# Patient Record
Sex: MALE | Race: WHITE | NOT HISPANIC OR LATINO | Employment: FULL TIME | URBAN - METROPOLITAN AREA
[De-identification: names, ages, dates, MRNs, and addresses within clinical notes are randomized per-mention and may not be internally consistent; named-entity substitution may affect disease eponyms.]

---

## 2021-11-01 ASSESSMENT — ENCOUNTER SYMPTOMS
POSTURAL DYSPNEA: 0
BACK PAIN: 1
ARTHRALGIAS: 1
SPUTUM PRODUCTION: 0
INSOMNIA: 0
SHORTNESS OF BREATH: 0
COUGH: 1
SNORES LOUDLY: 1
NERVOUS/ANXIOUS: 0
HEMOPTYSIS: 0
DYSPNEA ON EXERTION: 0
MYALGIAS: 1
DECREASED CONCENTRATION: 0
COUGH DISTURBING SLEEP: 0
MUSCLE WEAKNESS: 0
WHEEZING: 0
DEPRESSION: 0
STIFFNESS: 0
MUSCLE CRAMPS: 1
PANIC: 0
NECK PAIN: 1
JOINT SWELLING: 1

## 2021-11-02 NOTE — PROGRESS NOTES
CC: Establish care      HPI: a 46-year-old man without significant PMH presents today to establish care. He denies any symptoms at all. He works as a  at the [employer]. He smokes a pack of cigarettes per day for 26 years and had used Chantix in the past, which, per patient, it helped with smoking cessation last time. He also mentioned that, recently, his friend passed away because of prostate cancer and he would like to get prostate cancer screening today. Denies family history of prostate cancer or any urinary symptoms.       ROS: Review Of Systems  Skin: negative  Eyes: negative  Ears/Nose/Throat: negative  Respiratory: No shortness of breath, dyspnea on exertion, cough, or hemoptysis  Cardiovascular: negative  Gastrointestinal: negative  Genitourinary: negative  Musculoskeletal: negative and positive for negative  Neurologic: negative  Psychiatric: negative  Hematologic/Lymphatic/Immunologic: negative  Endocrine: negative      RHM:    Vaccinations:   COVID-19: completed on 4/16/2021 and 5/19/2021   Influenza: due today   Tetanus (1 Tdap then td/tdap q10y): 11/16/2009  Screening:   PSA: interested  Sexually active yes, STI screen not interested   Diet: regular diet  Exercise: none  Lifestyle: tobacco use: 1 pack/day for 26 years. Alcohol use: 3-4 beers/day everyday. Recreational drug use: none.  Sleep: 7-8 hrs/night    No past medical history on file.  No past surgical history on file.  No family history on file.  Social History     Tobacco Use     Smoking status: Not on file   Substance Use Topics     Alcohol use: Not on file     Drug use: Not on file     No current outpatient medications on file.      Not on File      /87 (BP Location: Right arm, Patient Position: Sitting, Cuff Size: Adult Regular)   Pulse 103   Temp 98.2  F (36.8  C) (Oral)   Resp 16   Ht 1.829 m (6')   Wt 73 kg (161 lb)   SpO2 97%   BMI 21.84 kg/m      Physical Examination:    General:  Conversant, generally healthy  appearing, no acute distress  Head: atraumatic  Eyes:  Pupils 2-3 mm, sclera white, EOM's full, conjunctiva moist, no periorbital swelling    Ears:  TM's normal, EAC's patent, clear of cerumen  Nose:  Nasal passages clear, turbinates not swollen  Throat/Mouth:  No pharyngeal erythema, exudate, ulcers, oral mucosa and tongue moist, normal hard and soft palate  Neck:  Trachea midline, Full AROM, supple, thyroid smooth, symmetric, not enlarged, no nodules, no neck lymphadenopathy  Lungs:  Clear to auscultation throughout, no wheezes, rhonchi or rales. Normal respiratory effort and no intercostal retractions.  C/V:  Regular rate and rhythm, no murmurs, rubs or gallops.  No JVD, no carotid bruits. Radial and DP pulses 2+, equal and regular.  Abdomen:  Not distended.  Bowel sounds active.  No tenderness, no hepatosplenomegaly or masses.  No CVA tenderness or masses.  Lymph:  No cervical lymph nodes.  Neuro: Alert and oriented, face symmetric. Able to get on/off exam table without assistance.  Strength grossly intact. No tremor.  Gait steady.   M/S:   No joint deformities noted.  No joint swelling.  Skin:   Normal temperature., turgor and texture. No rashes, suspicious lesions, or jaundice on exposed skin surfaces.   Extremities:  No peripheral edema, no digital cyanosis  Psych:  Alert and oriented. Appropriate affect.  Not psychomotor slowed.  No signs of anxiety or agitation.      Assessment and Plan:  46-year-old man without significant PMH presents today to establish care.    #Health Maintenance  He is due for cholesterol screening, a flu shot and tetanus booster. He also mentioned that, recently, his friend passed away because of prostate cancer and he would like to get prostate cancer screening today. Denies family history of prostate cancer or any urinary symptoms.  Plan:  - Flu shot ad Tdap booster today  - PSA and non-fasting lipid profile today  - Follow up with me in 1 year    #Smoking Cessation   He smokes a  pack of cigarettes per day for 26 years and had used Chantix in the past, which, per patient, it helped with smoking cessation last time.  Plan:  - Chantix Kanu today    Apolonia Avila MD  Internal Medicine Resident (PGY1)  [phone]      This patient was discussed and seen with CURT Castro  Answers for HPI/ROS submitted by the patient on 11/1/2021  General Symptoms: No  Skin Symptoms: No  HENT Symptoms: No  EYE SYMPTOMS: No  HEART SYMPTOMS: No  LUNG SYMPTOMS: Yes  INTESTINAL SYMPTOMS: No  URINARY SYMPTOMS: No  REPRODUCTIVE SYMPTOMS: No  SKELETAL SYMPTOMS: Yes  BLOOD SYMPTOMS: No  NERVOUS SYSTEM SYMPTOMS: No  MENTAL HEALTH SYMPTOMS: Yes  Cough: Yes  Sputum or phlegm: No  Coughing up blood: No  Difficulty breating or shortness of breath: No  Snoring: Yes  Wheezing: No  Difficulty breathing on exertion: No  Nighttime Cough: No  Difficulty breathing when lying flat: No  Back pain: Yes  Muscle aches: Yes  Neck pain: Yes  Swollen joints: Yes  Joint pain: Yes  Bone pain: No  Muscle cramps: Yes  Muscle weakness: No  Joint stiffness: No  Bone fracture: No  Nervous or Anxious: No  Depression: No  Trouble sleeping: No  Trouble thinking or concentrating: No  Mood changes: No  Panic attacks: No

## 2021-11-04 ENCOUNTER — OFFICE VISIT (OUTPATIENT)
Dept: INTERNAL MEDICINE | Facility: CLINIC | Age: 46
End: 2021-11-04
Payer: COMMERCIAL

## 2021-11-04 ENCOUNTER — LAB (OUTPATIENT)
Dept: LAB | Facility: CLINIC | Age: 46
End: 2021-11-04
Payer: COMMERCIAL

## 2021-11-04 VITALS
WEIGHT: 161 LBS | RESPIRATION RATE: 16 BRPM | HEART RATE: 103 BPM | DIASTOLIC BLOOD PRESSURE: 87 MMHG | HEIGHT: 72 IN | SYSTOLIC BLOOD PRESSURE: 126 MMHG | OXYGEN SATURATION: 97 % | BODY MASS INDEX: 21.81 KG/M2 | TEMPERATURE: 98.2 F

## 2021-11-04 DIAGNOSIS — Z71.6 ENCOUNTER FOR SMOKING CESSATION COUNSELING: ICD-10-CM

## 2021-11-04 DIAGNOSIS — Z23 NEED FOR TDAP VACCINATION: ICD-10-CM

## 2021-11-04 DIAGNOSIS — Z00.00 ROUTINE HISTORY AND PHYSICAL EXAMINATION OF ADULT: Primary | ICD-10-CM

## 2021-11-04 DIAGNOSIS — Z00.00 ROUTINE HISTORY AND PHYSICAL EXAMINATION OF ADULT: ICD-10-CM

## 2021-11-04 LAB
CHOLEST SERPL-MCNC: 291 MG/DL
FASTING STATUS PATIENT QL REPORTED: NO
HDLC SERPL-MCNC: 150 MG/DL
LDLC SERPL CALC-MCNC: 128 MG/DL
NONHDLC SERPL-MCNC: 141 MG/DL
PSA SERPL-MCNC: 0.84 UG/L (ref 0–4)
TRIGL SERPL-MCNC: 64 MG/DL

## 2021-11-04 PROCEDURE — 90686 IIV4 VACC NO PRSV 0.5 ML IM: CPT

## 2021-11-04 PROCEDURE — G0103 PSA SCREENING: HCPCS | Performed by: PATHOLOGY

## 2021-11-04 PROCEDURE — 36415 COLL VENOUS BLD VENIPUNCTURE: CPT | Performed by: PATHOLOGY

## 2021-11-04 PROCEDURE — 99204 OFFICE O/P NEW MOD 45 MIN: CPT | Mod: 25

## 2021-11-04 PROCEDURE — 90715 TDAP VACCINE 7 YRS/> IM: CPT

## 2021-11-04 PROCEDURE — 90472 IMMUNIZATION ADMIN EACH ADD: CPT

## 2021-11-04 PROCEDURE — 80061 LIPID PANEL: CPT | Performed by: PATHOLOGY

## 2021-11-04 PROCEDURE — 90471 IMMUNIZATION ADMIN: CPT

## 2021-11-04 ASSESSMENT — MIFFLIN-ST. JEOR: SCORE: 1648.29

## 2021-11-04 ASSESSMENT — PAIN SCALES - GENERAL: PAINLEVEL: NO PAIN (0)

## 2021-11-04 NOTE — NURSING NOTE
Florian Rodriguez is a 46 year old male patient that presents today in clinic for the following:    Chief Complaint   Patient presents with     Physical     Prostate check, would like prescription for chantax     Establish Care     The patient's allergies and medications were reviewed as noted. A set of vitals were recorded as noted without incident. The patient does not have any other questions for the provider.    Franci Hercules, EMT at 1:55 PM on 11/4/2021

## 2021-11-04 NOTE — PATIENT INSTRUCTIONS
To schedule your 1 year follow up with Dr. Avila, please call [phone] (4th Floor [facility] [place] [facility])

## 2021-12-12 ENCOUNTER — HEALTH MAINTENANCE LETTER (OUTPATIENT)
Age: 46
End: 2021-12-12

## 2022-01-09 ENCOUNTER — NURSE TRIAGE (OUTPATIENT)
Dept: NURSING | Facility: CLINIC | Age: 47
End: 2022-01-09
Payer: COMMERCIAL

## 2022-01-09 NOTE — TELEPHONE ENCOUNTER
"Triage Call:     Pt calling to report the following:     Got sick at work on Friday and exhausted Saturday and called off today  He felt like his head was on fire, but no fever  He went to get a COVID-19 test today    Head Lump:   Pt has a \"lump on the back of his head\" that he found yesterday  No blood   \"Sore spot\"  Smaller than 2 inches  He doesn't know how it got there    Memory issues:  Patient doesn't remember driving home from work the other day  His girlfriend stated to patient that he has \"been repeating questions over the last 3-5 days\"    Headache:   Headache for the last couple of days  1/10 headache pain    Social Hx:   No drug use or abuse  Drinks; but does not abuse alcohol  Pt is a     Sleep:   Not sleeping well at night, but has no issues with sleeping during the day    Disposition: Go to ED now. Pt is declining this recommendation and stated that he will follow up with his PCP instead. He was able to get an appointment for tomorrow, 1/10/2022 in the AM.     Nancie Horton RN  [facility] Nurse Advisor 4:21 PM 1/9/2022      Reason for Disposition    [1] New onset confusion AND [2] no prior diagnosis of dementia    [1] Worsening confusion AND [2] new onset (hours to 3 days)    Patient is confused or is an unreliable provider of information (e.g., dementia, severe intellectual disability, alcohol intoxication)    Additional Information    Negative: [1] Difficult to awaken or acting confused (e.g., disoriented, slurred speech) AND [2] present now AND [3] new onset AND [4] has diabetes (diabetes mellitus)    Negative: [1] Difficult to awaken or acting confused (e.g., disoriented, slurred speech) AND [2] present now AND [3] new onset    Negative: [1] Weakness of the face, arm, or leg on one side of the body AND [2] new onset    Negative: [1] Numbness of the face, arm, or leg on one side of the body AND [2] new onset    Negative: [1] Loss of speech or garbled speech AND [2] new onset    " Negative: Shock suspected (e.g., cold/pale/clammy skin, too weak to stand, low BP, rapid pulse)    Negative: Sounds like a life-threatening emergency to the triager    Negative: [1] Difficult to awaken or acting confused (e.g., disoriented, slurred speech) AND [2] present now AND [3] has diabetes (diabetes mellitus)    Negative: [1] Difficult to awaken or acting confused (e.g., disoriented, slurred speech) AND [2] present now AND [3] new onset    Negative: [1] Weakness of the face, arm, or leg on one side of the body AND [2] new onset    Negative: [1] Numbness of the face, arm, or leg on one side of the body AND [2] new onset    Negative: [1] Loss of speech or garbled speech AND [2] new onset    Negative: Difficulty breathing or bluish lips    Negative: Shock suspected (e.g., cold/pale/clammy skin, too weak to stand, low BP, rapid pulse)    Negative: Seeing, hearing, or feeling things that are not there (i.e., visual, auditory, or tactile hallucinations)    Negative: Followed a head injury    Negative: Drug overdose suspected    Negative: Sounds like a life-threatening emergency to the triager    Negative: Alcohol use, abuse or dependence: question or problem related to    Negative: Drug abuse or dependence: question or problem related to    Negative: Headache or vomiting    Negative: Stiff neck (can't touch chin to chest)    Negative: Very strange or paranoid behavior    Negative: Fever > 100.4 F (38.0 C)    Negative: Patient sounds very sick or weak to the triager    Negative: [1] ACUTE NEURO SYMPTOM AND [2] present now  (DEFINITION: difficult to awaken OR confused thinking and talking OR slurred speech OR weakness of arms OR unsteady walking)    Negative: Knocked out (unconscious) > 1 minute    Negative: Seizure (convulsion) occurred  (Exception: prior history of seizures and now alert and without Acute Neuro Symptoms)    Negative: Penetrating head injury (e.g., knife, gun shot wound, metal object)    Negative:  "[1] Major bleeding (e.g., actively dripping or spurting) AND [2] can't be stopped    Negative: [1] Dangerous mechanism of injury (e.g., MVA, diving, trampoline, contact sports, fall > 10 feet or 3 meters) AND [2] NECK pain AND [3] began < 1 hour after injury    Negative: Sounds like a life-threatening emergency to the triager    Negative: [1] Diagnosed with concussion AND [2] within last 14 days    Negative: [1] Traumatic brain injury (mTBI; concussion) AND [2] more than 14 days since head injury    Negative: [1] Loss of vision or double vision AND [2] present now    Negative: Watery or blood-tinged fluid dripping from the NOSE or EARS now  (Exception: tears from crying or nosebleed from nasal trauma)    Negative: [1] One or two \"black eyes\" (bruising, purple color of eyelids) AND [2] onset within 24 hours of head injury    Negative: Large swelling or bruise > 2 inches (5 cm)    Negative: Skin is split open or gaping  (or length > 1/2 inch or 12 mm)    Negative: [1] Bleeding AND [2] won't stop after 10 minutes of direct pressure (using correct technique)    Negative: Sounds like a serious injury to the triager    Negative: Can't remember what happened (amnesia)    Negative: Vomiting once or more    Negative: [1] ACUTE NEURO SYMPTOM AND [2] now fine  (DEFINITION: difficult to awaken OR confused thinking and talking OR slurred speech OR weakness of arms OR unsteady walking)    Negative: [1] Knocked out (unconscious) < 1 minute AND [2] now fine    Negative: [1] SEVERE headache AND [2] not improved 2 hours after pain medicine/ice packs    Negative: Dangerous injury (e.g., MVA, diving, trampoline, contact sports, fall > 10 feet or 3 meters) or severe blow from hard object (e.g., golf club or baseball bat)    Negative: Taking Coumadin (warfarin) or other strong blood thinner, or known bleeding disorder (e.g., thrombocytopenia)    Negative: Severe agitation or behavior problem (e.g., patient endangering self or others)    " Negative: Swallowing problems    Negative: Weakness or fatigue is main concern    Negative: Anxiety or panic attack is the main concern    Negative: Depression is the main concern    Protocols used: DEMENTIA SYMPTOMS AND [id number]-MC, HEAD INJURY-A-, CONFUSION - DELIRIUM-A-AH

## 2022-01-10 ENCOUNTER — HOSPITAL ENCOUNTER (EMERGENCY)
Facility: CLINIC | Age: 47
End: 2022-01-10
Payer: COMMERCIAL

## 2022-01-10 ENCOUNTER — APPOINTMENT (OUTPATIENT)
Dept: CT IMAGING | Facility: CLINIC | Age: 47
End: 2022-01-10
Attending: EMERGENCY MEDICINE
Payer: COMMERCIAL

## 2022-01-10 ENCOUNTER — OFFICE VISIT (OUTPATIENT)
Dept: FAMILY MEDICINE | Facility: CLINIC | Age: 47
End: 2022-01-10
Payer: COMMERCIAL

## 2022-01-10 ENCOUNTER — HOSPITAL ENCOUNTER (EMERGENCY)
Facility: CLINIC | Age: 47
Discharge: HOME | End: 2022-01-10
Attending: EMERGENCY MEDICINE | Admitting: EMERGENCY MEDICINE
Payer: COMMERCIAL

## 2022-01-10 VITALS
WEIGHT: 161 LBS | HEART RATE: 102 BPM | BODY MASS INDEX: 21.84 KG/M2 | TEMPERATURE: 98.1 F | SYSTOLIC BLOOD PRESSURE: 118 MMHG | DIASTOLIC BLOOD PRESSURE: 89 MMHG | OXYGEN SATURATION: 98 %

## 2022-01-10 VITALS
HEIGHT: 72 IN | HEART RATE: 104 BPM | TEMPERATURE: 98 F | DIASTOLIC BLOOD PRESSURE: 96 MMHG | RESPIRATION RATE: 16 BRPM | SYSTOLIC BLOOD PRESSURE: 125 MMHG | BODY MASS INDEX: 22.35 KG/M2 | OXYGEN SATURATION: 96 % | WEIGHT: 165 LBS

## 2022-01-10 DIAGNOSIS — S09.90XA HEAD INJURIES, INITIAL ENCOUNTER: ICD-10-CM

## 2022-01-10 DIAGNOSIS — F10.920 ALCOHOLIC INTOXICATION WITHOUT COMPLICATION (H): ICD-10-CM

## 2022-01-10 DIAGNOSIS — R41.3 MEMORY LOSS: Primary | ICD-10-CM

## 2022-01-10 DIAGNOSIS — F10.20 ALCOHOLISM (H): ICD-10-CM

## 2022-01-10 LAB
ALBUMIN SERPL-MCNC: 4.2 G/DL (ref 3.4–5)
ALP SERPL-CCNC: 88 U/L (ref 40–150)
ALT SERPL W P-5'-P-CCNC: 112 U/L (ref 0–70)
ANION GAP SERPL CALCULATED.3IONS-SCNC: 10 MMOL/L (ref 3–14)
AST SERPL W P-5'-P-CCNC: 165 U/L (ref 0–45)
BASOPHILS # BLD AUTO: 0.1 10E3/UL (ref 0–0.2)
BASOPHILS NFR BLD AUTO: 2 %
BILIRUB SERPL-MCNC: 0.5 MG/DL (ref 0.2–1.3)
BUN SERPL-MCNC: 6 MG/DL (ref 7–30)
CALCIUM SERPL-MCNC: 8.8 MG/DL (ref 8.5–10.1)
CHLORIDE BLD-SCNC: 102 MMOL/L (ref 94–109)
CO2 SERPL-SCNC: 28 MMOL/L (ref 20–32)
CREAT SERPL-MCNC: 0.64 MG/DL (ref 0.66–1.25)
EOSINOPHIL # BLD AUTO: 0.2 10E3/UL (ref 0–0.7)
EOSINOPHIL NFR BLD AUTO: 4 %
ERYTHROCYTE [DISTWIDTH] IN BLOOD BY AUTOMATED COUNT: 14.4 % (ref 10–15)
ETHANOL SERPL-MCNC: 0.3 G/DL
GFR SERPL CREATININE-BSD FRML MDRD: >90 ML/MIN/1.73M2
GLUCOSE BLD-MCNC: 84 MG/DL (ref 70–99)
HCT VFR BLD AUTO: 42.9 % (ref 40–53)
HGB BLD-MCNC: 14.5 G/DL (ref 13.3–17.7)
HOLD SPECIMEN: NORMAL
HOLD SPECIMEN: NORMAL
IMM GRANULOCYTES # BLD: 0 10E3/UL
IMM GRANULOCYTES NFR BLD: 1 %
LYMPHOCYTES # BLD AUTO: 1.7 10E3/UL (ref 0.8–5.3)
LYMPHOCYTES NFR BLD AUTO: 30 %
MCH RBC QN AUTO: 35 PG (ref 26.5–33)
MCHC RBC AUTO-ENTMCNC: 33.8 G/DL (ref 31.5–36.5)
MCV RBC AUTO: 104 FL (ref 78–100)
MONOCYTES # BLD AUTO: 0.5 10E3/UL (ref 0–1.3)
MONOCYTES NFR BLD AUTO: 9 %
NEUTROPHILS # BLD AUTO: 3.1 10E3/UL (ref 1.6–8.3)
NEUTROPHILS NFR BLD AUTO: 54 %
NRBC # BLD AUTO: 0 10E3/UL
NRBC BLD AUTO-RTO: 0 /100
PLATELET # BLD AUTO: 165 10E3/UL (ref 150–450)
POTASSIUM BLD-SCNC: 3.9 MMOL/L (ref 3.4–5.3)
PROT SERPL-MCNC: 7.9 G/DL (ref 6.8–8.8)
RBC # BLD AUTO: 4.14 10E6/UL (ref 4.4–5.9)
SODIUM SERPL-SCNC: 140 MMOL/L (ref 133–144)
WBC # BLD AUTO: 5.6 10E3/UL (ref 4–11)

## 2022-01-10 PROCEDURE — 36415 COLL VENOUS BLD VENIPUNCTURE: CPT | Performed by: EMERGENCY MEDICINE

## 2022-01-10 PROCEDURE — 80053 COMPREHEN METABOLIC PANEL: CPT | Performed by: EMERGENCY MEDICINE

## 2022-01-10 PROCEDURE — 99284 EMERGENCY DEPT VISIT MOD MDM: CPT | Mod: 25

## 2022-01-10 PROCEDURE — 82077 ASSAY SPEC XCP UR&BREATH IA: CPT | Performed by: EMERGENCY MEDICINE

## 2022-01-10 PROCEDURE — 70450 CT HEAD/BRAIN W/O DYE: CPT

## 2022-01-10 PROCEDURE — 84425 ASSAY OF VITAMIN B-1: CPT | Performed by: EMERGENCY MEDICINE

## 2022-01-10 PROCEDURE — 99215 OFFICE O/P EST HI 40 MIN: CPT | Performed by: NURSE PRACTITIONER

## 2022-01-10 PROCEDURE — 85025 COMPLETE CBC W/AUTO DIFF WBC: CPT | Performed by: EMERGENCY MEDICINE

## 2022-01-10 RX ORDER — CHLORDIAZEPOXIDE HYDROCHLORIDE 5 MG/1
10 CAPSULE, GELATIN COATED ORAL 3 TIMES DAILY PRN
Qty: 12 CAPSULE | Refills: 0 | Status: SHIPPED | OUTPATIENT
Start: 2022-01-10 | End: 2022-01-13

## 2022-01-10 ASSESSMENT — ENCOUNTER SYMPTOMS
FEVER: 0
DIARRHEA: 0
SHORTNESS OF BREATH: 0
COUGH: 0
ABDOMINAL PAIN: 0
CHILLS: 0
VOMITING: 1
SORE THROAT: 0
RHINORRHEA: 0

## 2022-01-10 ASSESSMENT — MIFFLIN-ST. JEOR: SCORE: 1666.44

## 2022-01-10 NOTE — PROGRESS NOTES
"  Assessment & Plan     Memory loss/Head injuries, initial encounter  Patient w/recent head injury of unknown etiology; unknown LOC; new onset memory loss, n/v, downiness  Balance disturbance ?DDx head trauma w/bleed; concussion; tbi  - discussed with patient need for additional testing ie CT head to r/o bleed; additional laboratory workup for potential memory loss, etoh   - call report to [facility] ED [facility]UC staff Estela, patient given directions and information. GF to drive patient to ED today.    Alcoholism (H)  history of drinking 5-6 drinks per day; recent unknown head injury  - encouraged to reduce drinking; will need to monitor for withdrawal   - recommend ED follow up      Tobacco Cessation:   reports that he has been smoking cigarettes. He started smoking about 27 years ago. He has never used smokeless tobacco.  Tobacco Cessation Action Plan: Self help information given to patient    FUTURE APPOINTMENTS:       - Follow-up for annual visit or as needed    VERÓNICA Mena CNP  [facility]    Kayleigh Du is a 46 year old who presents for the following health issues new onset memory loss, head inury.  Patient in clinic with GF with new onset memory loss, posterior head injury unsure how it occurred; first noticed on Friday (1/7) was found at work sitting on the floor w/c/o \"not feeling well\" +left tempora HA reports \"brain on fire\" occurred multiple times thorughout the evening; + dizziness/LH; + n/v on Friday; GF reports speech slurred during phone call; no UE weakness, dysphagia, very sleepy on Friday evening; was able to go to work on Saturday @1pm GF reports speech was improved; patient was then sent home on Saturday (1/8) after 2 hours of work due to continued symptoms of HA. N/V, dizziness, LH, chills patient with loss of memory of driving home, dismissed from work or speaking to GF on Saturday. Tested for covid on 1/9 results pending.  Hx of alcohol use 5-6 drinks per " "day; works as a  at [employer]. Denies hx of blackouts in the past. Unsure if fall or injury prior to symptoms; unknown LOC    HPI     Lump on back of his head not sure how he getting, have memory issue forgetting thing, stomach issues and flu like symptom like head on fire.      Review of Systems   CONSTITUTIONAL: NEGATIVE for fever, chills, change in weight  RESP: NEGATIVE for significant cough or SOB  CV: NEGATIVE for chest pain, palpitations or peripheral edema  MUSCULOSKELETAL: muscle pain      Objective    /89 (BP Location: Right arm, Patient Position: Chair, Cuff Size: Adult Regular)   Pulse 102   Temp 98.1  F (36.7  C) (Temporal)   Wt 73 kg (161 lb)   SpO2 98%   BMI 21.84 kg/m    Body mass index is 21.84 kg/m .     Physical Exam   GENERAL: healthy, alert and tearful, anxious  Head:  posterior head lump \"goose egg\" nontender;   Eyes: PERRLA, EOMI; no nystagmus;   Ears: Tympanic membranes normal bilaterally   NECK: no adenopathy, no asymmetry,  RESP: lungs clear to auscultation - no rales, rhonchi or wheezes  CV: regular rate and rhythm, normal S1 S2, no S3 or S4, no murmur, click or rub, no peripheral edema and peripheral pulses strong  ABDOMEN: soft, nontender, no hepatosplenomegaly, no masses and bowel sounds normal  NEURO: Normal strength and tone, abnormal mental status - forgetful, oriented times x3, speech normal, cranial nerves 2-12 intact, gait abnormal: ataxia, Romberg abnormal and rapid alternating movements normal           "

## 2022-01-10 NOTE — ED TRIAGE NOTES
Pt presents to the ER with c/o new onset memory loss, posterior head injury and unsure how it occurred.  First noticed on Friday 1/7  . PMD sent pt here for further eval and CT.   Pt denies c/o weakness, N/T, or vision changes.

## 2022-01-10 NOTE — DISCHARGE INSTRUCTIONS
As we discussed, you are still intoxicated here in the emergency room, and I do believe that is the reason you are not feeling well, and possibly have memory issues.  Please do not forget more alcohol, and take the Librium that we have given you, as needed for any anxiety, shakes or withdrawal symptoms you have later today.  Come back to the ER immediately if the medication that was given he was not helping and you still have persistent shakes or signs of withdrawal.  Come back with any other concerns you have as well, any vomiting, or any other symptoms that you develop.  Please be absolutely certain to follow-up with your regular doctor the next 3 days as well, out of an abundance of caution.

## 2022-01-10 NOTE — ED PROVIDER NOTES
"  History   Chief Complaint:  Head Injury     The history is provided by the patient.      Florian Rodriguez is a 46 year old male with no significant medical history who presents with head injury. Patient reports that for the past 3 days he has been experiencing cognitive changes, including short term memory loss. States that he is forgetting conversations with his girlfriend, as well as tasks he is supposed to do. His girlfriend endorses that the patient has also been slurring his speech intermittently over this time period. Patient adds that he has a small \"lump\" to the posterior side of his scalp, however he denies known falls or trauma to the area.     Patient's girlfriend adds that the patient was sent home from work both Friday and Saturday. Of note, patient [place] a  [place] She reports that the patient had an episode of \"vomiting and rolling on the floor\" at work on Friday and then on Saturday, he had headaches and \"shakes\". He denies drinking at work, however he admits to heavy alcohol use outside of work. No fevers, chills, congestions, rhinorrhea, sore throat, chest pain, cough, shortness of breath, abdominal pain or diarrhea. No urinary symptoms.    Review of Systems   Constitutional: Negative for chills and fever.   HENT: Negative for congestion, rhinorrhea and sore throat.    Respiratory: Negative for cough and shortness of breath.    Cardiovascular: Negative for chest pain.   Gastrointestinal: Positive for vomiting. Negative for abdominal pain and diarrhea.   Genitourinary: Negative.    Neurological:        Memory loss   All other systems reviewed and are negative.    Allergies:  No Known Allergies    Medications:  Chantix    Past Medical History:     Tobacco abuse    Social History:  Presents with girlfriend, Brooklynn  Drinks alcohol regularly  Patient works as a  at the [place]    Physical Exam     Patient Vitals for the past 24 hrs:   BP Temp Temp src Pulse Resp SpO2 Height " Weight   01/10/22 1230 (!) 125/96 -- -- 104 -- 96 % -- --   01/10/22 1224 -- -- -- -- -- 96 % -- --   01/10/22 1223 (!) 121/92 -- -- 73 -- -- -- --   01/10/22 1109 (!) 135/90 98  F (36.7  C) Oral 93 16 100 % 1.829 m (6') 74.8 kg (165 lb)       Physical Exam  Vitals: reviewed by me  General: Pt seen on [facility], cooperative, and alert to conversation, slurring his speech occasionally, intoxicated appearing.  Eyes: Tracking well, clear conjunctiva BL  ENT: MMM, midline trachea.   Lungs: No tachypnea, no accessory muscle use. No respiratory distress.   CV: Rate as above  Abd: Soft, non tender, no guarding, no rebound. Non distended  MSK: no joint effusion.  No evidence of trauma  Skin: No rash  Neuro: Occasionally slurred speech and no facial droop.  Moving all extremity spontaneously, following commands.  Psych: Not RIS, no e/o AH/VH      Emergency Department Course   Imaging:  CT Head w/o Contrast   Final Result   IMPRESSION:   No evidence of acute intracranial hemorrhage, mass, or   herniation.         DAMIAN MURILLO MD            SYSTEM ID:  [id number]        Report per radiology    Laboratory:  Labs Ordered and Resulted from Time of ED Arrival to Time of ED Departure   COMPREHENSIVE METABOLIC PANEL - Abnormal       Result Value    Sodium 140      Potassium 3.9      Chloride 102      Carbon Dioxide (CO2) 28      Anion Gap 10      Urea Nitrogen 6 (*)     Creatinine 0.64 (*)     Calcium 8.8      Glucose 84      Alkaline Phosphatase 88       (*)      (*)     Protein Total 7.9      Albumin 4.2      Bilirubin Total 0.5      GFR Estimate >90     CBC WITH PLATELETS AND DIFFERENTIAL - Abnormal    WBC Count 5.6      RBC Count 4.14 (*)     Hemoglobin 14.5      Hematocrit 42.9       (*)     MCH 35.0 (*)     MCHC 33.8      RDW 14.4      Platelet Count 165      % Neutrophils 54      % Lymphocytes 30      % Monocytes 9      % Eosinophils 4      % Basophils 2      % Immature Granulocytes 1       NRBCs per 100 WBC 0      Absolute Neutrophils 3.1      Absolute Lymphocytes 1.7      Absolute Monocytes 0.5      Absolute Eosinophils 0.2      Absolute Basophils 0.1      Absolute Immature Granulocytes 0.0      Absolute NRBCs 0.0     ETHYL ALCOHOL LEVEL - Abnormal    Alcohol ethyl 0.30 (*)    VITAMIN B1 WHOLE BLOOD        Procedures    Emergency Department Course:  Reviewed:  I reviewed nursing notes, vitals, past medical history, Care Everywhere and [facility]    Assessments:  1226 I obtained history and examined the patient as noted above.   1315 I rechecked the patient and explained findings.     Disposition:  The patient was discharged to home.     Impression & Plan     Medical Decision Making:  This is a pleasant 46-year-old gentleman presents the emergency room with appears to be active and repetitive alcohol consumption.  He is intoxicated here in the ER, and I think this likely explains his symptoms.  It certainly would explain having conversations that he forgets, it would explain his memory loss, vomiting, headaches, and being unable to work appropriately.  I do think he is stable for discharge home, he has a sober ride, we talked about what signs and symptoms of alcohol withdrawal would look like and he was asked to taper down his alcohol use.  Female friend at the bedside okay with this plan, she is sober, and will watch the patient throughout the day.  I did give him some Librium to help with impending withdrawal, and they know to come back to the ER immediately if the Librium is insufficient.  They also noted follow-up with her regular doctor the next 1 week, red flags when to go back to the ER were discussed in detail    Covid-19  Florian BUSTAMANTE Michael was evaluated during a global COVID-19 pandemic, which necessitated consideration that the patient might be at risk for infection with the SARS-CoV-2 virus that causes COVID-19.   Applicable protocols for evaluation were followed during the patient's care.    COVID-19 was considered as part of the patient's evaluation. The plan for testing is:  a test was obtained during this visit.    Diagnosis:    ICD-10-CM    1. Alcoholic intoxication without complication (H)  F10.920        Discharge Medications:  New Prescriptions    CHLORDIAZEPOXIDE (LIBRIUM) 5 MG CAPSULE    Take 2 capsules (10 mg) by mouth 3 times daily as needed for anxiety       Scribe Disclosure:  Haroldo TABARES, am serving as a scribe at 11:42 AM on 1/10/2022 to document services personally performed by Prakash Charles MD based on my observations and the provider's statements to me.            Prakash Charles MD  01/10/22 [id number]

## 2022-01-13 LAB — VIT B1 PYROPHOSHATE BLD-SCNC: 109 NMOL/L

## 2022-02-03 ENCOUNTER — VIRTUAL VISIT (OUTPATIENT)
Dept: SLEEP MEDICINE | Facility: CLINIC | Age: 47
End: 2022-02-03
Payer: COMMERCIAL

## 2022-02-03 VITALS — HEIGHT: 72 IN | WEIGHT: 165 LBS | BODY MASS INDEX: 22.35 KG/M2

## 2022-02-03 DIAGNOSIS — G47.9 SLEEP DISTURBANCE: Primary | ICD-10-CM

## 2022-02-03 PROCEDURE — 99207 PR NO BILLABLE SERVICE THIS VISIT: CPT | Performed by: INTERNAL MEDICINE

## 2022-02-03 ASSESSMENT — SLEEP AND FATIGUE QUESTIONNAIRES
HOW LIKELY ARE YOU TO NOD OFF OR FALL ASLEEP WHILE SITTING QUIETLY AFTER LUNCH WITHOUT ALCOHOL: WOULD NEVER DOZE
HOW LIKELY ARE YOU TO NOD OFF OR FALL ASLEEP WHEN YOU ARE A PASSENGER IN A CAR FOR AN HOUR WITHOUT A BREAK: WOULD NEVER DOZE
HOW LIKELY ARE YOU TO NOD OFF OR FALL ASLEEP WHILE SITTING AND READING: WOULD NEVER DOZE
HOW LIKELY ARE YOU TO NOD OFF OR FALL ASLEEP IN A CAR, WHILE STOPPED FOR A FEW MINUTES IN TRAFFIC: WOULD NEVER DOZE
HOW LIKELY ARE YOU TO NOD OFF OR FALL ASLEEP WHILE SITTING INACTIVE IN A PUBLIC PLACE: WOULD NEVER DOZE
HOW LIKELY ARE YOU TO NOD OFF OR FALL ASLEEP WHILE WATCHING TV: WOULD NEVER DOZE
HOW LIKELY ARE YOU TO NOD OFF OR FALL ASLEEP WHILE LYING DOWN TO REST IN THE AFTERNOON WHEN CIRCUMSTANCES PERMIT: SLIGHT CHANCE OF DOZING
HOW LIKELY ARE YOU TO NOD OFF OR FALL ASLEEP WHILE SITTING AND TALKING TO SOMEONE: WOULD NEVER DOZE

## 2022-02-03 ASSESSMENT — MIFFLIN-ST. JEOR: SCORE: 1666.44

## 2022-02-03 NOTE — PROGRESS NOTES
Florian is a 46 year old who is being evaluated via a billable video visit.      How would you like to obtain your AVS? MyChart  If the video visit is dropped, the invitation should be resent by: Text to cell phone: [phone]  Will anyone else be joining your video visit? No      Spoke with the patient and he felt that since quitting alcohol, he no longer has any issues with sleep. He would like to cancel this consult if possible.    No charge.

## 2022-02-03 NOTE — PATIENT INSTRUCTIONS
Your BMI is Body mass index is 22.38 kg/m .  Weight management is a personal decision.  If you are interested in exploring weight loss strategies, the following discussion covers the approaches that may be successful. Body mass index (BMI) is one way to tell whether you are at a healthy weight, overweight, or obese. It measures your weight in relation to your height.  A BMI of 18.5 to 24.9 is in the healthy range. A person with a BMI of 25 to 29.9 is considered overweight, and someone with a BMI of 30 or greater is considered obese. More than two-thirds of American adults are considered overweight or obese.  Being overweight or obese increases the risk for further weight gain. Excess weight may lead to heart disease and diabetes.  Creating and following plans for healthy eating and physical activity may help you improve your health.  Weight control is part of healthy lifestyle and includes exercise, emotional health, and healthy eating habits. Careful eating habits lifelong are the mainstay of weight control. Though there are significant health benefits from weight loss, long-term weight loss with diet alone may be very difficult to achieve- studies show long-term success with dietary management in less than 10% of people. Attaining a healthy weight may be especially difficult to achieve in those with severe obesity. In some cases, medications, devices and surgical management might be considered.  What can you do?  If you are overweight or obese and are interested in methods for weight loss, you should discuss this with your provider.     Consider reducing daily calorie intake by 500 calories.     Keep a food journal.     Avoiding skipping meals, consider cutting portions instead.    Diet combined with exercise helps maintain muscle while optimizing fat loss. Strength training is particularly important for building and maintaining muscle mass. Exercise helps reduce stress, increase energy, and improves fitness.  Increasing exercise without diet control, however, may not burn enough calories to loose weight.       Start walking three days a week 10-20 minutes at a time    Work towards walking thirty minutes five days a week     Eventually, increase the speed of your walking for 1-2 minutes at time    And look into health and wellness programs that may be available through your health insurance provider, employer, local community center, or [facility].

## 2022-05-31 ENCOUNTER — VIRTUAL VISIT (OUTPATIENT)
Dept: FAMILY MEDICINE | Facility: CLINIC | Age: 47
End: 2022-05-31
Payer: COMMERCIAL

## 2022-05-31 DIAGNOSIS — U07.1 INFECTION DUE TO 2019 NOVEL CORONAVIRUS: Primary | ICD-10-CM

## 2022-05-31 PROCEDURE — 99213 OFFICE O/P EST LOW 20 MIN: CPT | Mod: 95 | Performed by: FAMILY MEDICINE

## 2022-05-31 NOTE — PROGRESS NOTES
Florian is a 46 year old who is being evaluated via a billable video visit.      How would you like to obtain your AVS? MyChart  If the video visit is dropped, the invitation should be resent by: Text to cell phone: [phone]  Will anyone else be joining your video visit? No    Video Start Time: 11.45 AM    Assessment & Plan     Infection due to 2019 novel coronavirus  Advised rest/fluids  See PMD in clinic due topost covid  He denies any sob    Return in about 1 week (around 6/7/2022) for recheck/ sooner if worse or New symptoms.    Dyana Tang MD  [facility]    Subjective   Florian is a 46 year old who presents for the following health issues     History of Present Illness       Reason for visit:  Long covid (Patient diagnosed with covid about 3 weeks ago. He is still having fatigue. )    He eats 2-3 servings of fruits and vegetables daily.He consumes 2 sweetened beverage(s) daily.He exercises with enough effort to increase his heart rate 60 or more minutes per day.  He exercises with enough effort to increase his heart rate 4 days per week.   He is taking medications regularly.     Review of Systems   Constitutional, HEENT, cardiovascular, pulmonary, GI, , musculoskeletal, neuro, skin, endocrine and psych systems are negative, except as otherwise noted.    no sob  No fever or chills  Feels Tired  Unable to go to work  Objective         Tested positive for covid 3 weeks ago  Has some Brain Fog  Feels Tired    Had sinus congestion  Had 3 other people in his Home had covid also  Vitals:  No vitals were obtained today due to virtual visit.    Physical Exam   GENERAL: Healthy, alert and no distress  EYES: Eyes grossly normal to inspection.  No discharge or erythema, or obvious scleral/conjunctival abnormalities.  RESP: No audible wheeze, cough, or visible cyanosis.  No visible retractions or increased work of breathing.    SKIN: Visible skin clear. No significant rash, abnormal pigmentation  or lesions.  NEURO: Cranial nerves grossly intact.  Mentation and speech appropriate for age.  PSYCH: Mentation appears normal, affect normal/bright, judgement and insight intact, normal speech and appearance well-groomed.      Video-Visit Details    Type of service:  Video Visit    Video End Time:12:06 PM    Originating Location (pt. Location): Home    Distant Location (provider location):  [facility]     Platform used for Video Visit: AgentPair

## 2022-06-03 ENCOUNTER — TELEPHONE (OUTPATIENT)
Dept: BEHAVIORAL HEALTH | Facility: CLINIC | Age: 47
End: 2022-06-03

## 2022-06-03 ENCOUNTER — HOSPITAL ENCOUNTER (EMERGENCY)
Facility: CLINIC | Age: 47
Discharge: HOME | End: 2022-06-04
Attending: EMERGENCY MEDICINE | Admitting: EMERGENCY MEDICINE
Payer: COMMERCIAL

## 2022-06-03 DIAGNOSIS — Z20.822 COVID-19 RULED OUT BY LABORATORY TESTING: ICD-10-CM

## 2022-06-03 DIAGNOSIS — F10.230 ALCOHOL DEPENDENCE WITH UNCOMPLICATED WITHDRAWAL (H): ICD-10-CM

## 2022-06-03 LAB
ALBUMIN SERPL-MCNC: 4.3 G/DL (ref 3.4–5)
ALCOHOL BREATH TEST: 0.2 (ref 0–0.01)
ALP SERPL-CCNC: 97 U/L (ref 40–150)
ALT SERPL W P-5'-P-CCNC: 67 U/L (ref 0–70)
AMPHETAMINES UR QL SCN: NORMAL
ANION GAP SERPL CALCULATED.3IONS-SCNC: 8 MMOL/L (ref 3–14)
AST SERPL W P-5'-P-CCNC: 90 U/L (ref 0–45)
BARBITURATES UR QL: NORMAL
BASOPHILS # BLD AUTO: 0.1 10E3/UL (ref 0–0.2)
BASOPHILS NFR BLD AUTO: 2 %
BENZODIAZ UR QL: NORMAL
BILIRUB SERPL-MCNC: 0.5 MG/DL (ref 0.2–1.3)
BUN SERPL-MCNC: 4 MG/DL (ref 7–30)
CALCIUM SERPL-MCNC: 9.4 MG/DL (ref 8.5–10.1)
CANNABINOIDS UR QL SCN: NORMAL
CHLORIDE BLD-SCNC: 103 MMOL/L (ref 94–109)
CO2 SERPL-SCNC: 28 MMOL/L (ref 20–32)
COCAINE UR QL: NORMAL
CREAT SERPL-MCNC: 0.51 MG/DL (ref 0.66–1.25)
EOSINOPHIL # BLD AUTO: 0.1 10E3/UL (ref 0–0.7)
EOSINOPHIL NFR BLD AUTO: 3 %
ERYTHROCYTE [DISTWIDTH] IN BLOOD BY AUTOMATED COUNT: 15.2 % (ref 10–15)
ETHANOL SERPL-MCNC: 0.18 G/DL
GFR SERPL CREATININE-BSD FRML MDRD: >90 ML/MIN/1.73M2
GLUCOSE BLD-MCNC: 105 MG/DL (ref 70–99)
HCT VFR BLD AUTO: 41.8 % (ref 40–53)
HGB BLD-MCNC: 14.8 G/DL (ref 13.3–17.7)
IMM GRANULOCYTES # BLD: 0 10E3/UL
IMM GRANULOCYTES NFR BLD: 1 %
LYMPHOCYTES # BLD AUTO: 1.1 10E3/UL (ref 0.8–5.3)
LYMPHOCYTES NFR BLD AUTO: 25 %
MAGNESIUM SERPL-MCNC: 2.5 MG/DL (ref 1.6–2.3)
MCH RBC QN AUTO: 35.1 PG (ref 26.5–33)
MCHC RBC AUTO-ENTMCNC: 35.4 G/DL (ref 31.5–36.5)
MCV RBC AUTO: 99 FL (ref 78–100)
MONOCYTES # BLD AUTO: 0.4 10E3/UL (ref 0–1.3)
MONOCYTES NFR BLD AUTO: 10 %
NEUTROPHILS # BLD AUTO: 2.7 10E3/UL (ref 1.6–8.3)
NEUTROPHILS NFR BLD AUTO: 59 %
NRBC # BLD AUTO: 0 10E3/UL
NRBC BLD AUTO-RTO: 0 /100
OPIATES UR QL SCN: NORMAL
PLATELET # BLD AUTO: 148 10E3/UL (ref 150–450)
POTASSIUM BLD-SCNC: 3.9 MMOL/L (ref 3.4–5.3)
PROT SERPL-MCNC: 8 G/DL (ref 6.8–8.8)
RBC # BLD AUTO: 4.22 10E6/UL (ref 4.4–5.9)
SARS-COV-2 RNA RESP QL NAA+PROBE: NEGATIVE
SODIUM SERPL-SCNC: 139 MMOL/L (ref 133–144)
WBC # BLD AUTO: 4.4 10E3/UL (ref 4–11)

## 2022-06-03 PROCEDURE — 250N000013 HC RX MED GY IP 250 OP 250 PS 637: Performed by: EMERGENCY MEDICINE

## 2022-06-03 PROCEDURE — 36415 COLL VENOUS BLD VENIPUNCTURE: CPT | Performed by: EMERGENCY MEDICINE

## 2022-06-03 PROCEDURE — 250N000011 HC RX IP 250 OP 636: Performed by: EMERGENCY MEDICINE

## 2022-06-03 PROCEDURE — 85025 COMPLETE CBC W/AUTO DIFF WBC: CPT | Performed by: EMERGENCY MEDICINE

## 2022-06-03 PROCEDURE — 82075 ASSAY OF BREATH ETHANOL: CPT | Performed by: EMERGENCY MEDICINE

## 2022-06-03 PROCEDURE — 96374 THER/PROPH/DIAG INJ IV PUSH: CPT | Performed by: EMERGENCY MEDICINE

## 2022-06-03 PROCEDURE — 80307 DRUG TEST PRSMV CHEM ANLYZR: CPT | Performed by: EMERGENCY MEDICINE

## 2022-06-03 PROCEDURE — 82077 ASSAY SPEC XCP UR&BREATH IA: CPT | Performed by: EMERGENCY MEDICINE

## 2022-06-03 PROCEDURE — 80053 COMPREHEN METABOLIC PANEL: CPT | Performed by: EMERGENCY MEDICINE

## 2022-06-03 PROCEDURE — 99284 EMERGENCY DEPT VISIT MOD MDM: CPT | Mod: 25 | Performed by: EMERGENCY MEDICINE

## 2022-06-03 PROCEDURE — U0003 INFECTIOUS AGENT DETECTION BY NUCLEIC ACID (DNA OR RNA); SEVERE ACUTE RESPIRATORY SYNDROME CORONAVIRUS 2 (SARS-COV-2) (CORONAVIRUS DISEASE [COVID-19]), AMPLIFIED PROBE TECHNIQUE, MAKING USE OF HIGH THROUGHPUT TECHNOLOGIES AS DESCRIBED BY CMS-2020-01-R: HCPCS | Performed by: EMERGENCY MEDICINE

## 2022-06-03 PROCEDURE — C9803 HOPD COVID-19 SPEC COLLECT: HCPCS | Performed by: EMERGENCY MEDICINE

## 2022-06-03 PROCEDURE — 83735 ASSAY OF MAGNESIUM: CPT | Performed by: EMERGENCY MEDICINE

## 2022-06-03 PROCEDURE — 99285 EMERGENCY DEPT VISIT HI MDM: CPT | Performed by: EMERGENCY MEDICINE

## 2022-06-03 PROCEDURE — 82040 ASSAY OF SERUM ALBUMIN: CPT | Performed by: EMERGENCY MEDICINE

## 2022-06-03 RX ORDER — DIAZEPAM 5 MG
5-20 TABLET ORAL EVERY 30 MIN PRN
Status: DISCONTINUED | OUTPATIENT
Start: 2022-06-03 | End: 2022-06-04 | Stop reason: HOSPADM

## 2022-06-03 RX ORDER — ONDANSETRON 2 MG/ML
4 INJECTION INTRAMUSCULAR; INTRAVENOUS ONCE
Status: COMPLETED | OUTPATIENT
Start: 2022-06-03 | End: 2022-06-03

## 2022-06-03 RX ADMIN — NICOTINE POLACRILEX 4 MG: 4 GUM, CHEWING ORAL at 20:00

## 2022-06-03 RX ADMIN — DIAZEPAM 5 MG: 5 TABLET ORAL at 14:34

## 2022-06-03 RX ADMIN — DIAZEPAM 10 MG: 5 TABLET ORAL at 19:50

## 2022-06-03 RX ADMIN — DIAZEPAM 10 MG: 5 TABLET ORAL at 17:39

## 2022-06-03 RX ADMIN — ONDANSETRON 4 MG: 2 INJECTION INTRAMUSCULAR; INTRAVENOUS at 16:52

## 2022-06-03 ASSESSMENT — ENCOUNTER SYMPTOMS
NECK STIFFNESS: 0
ABDOMINAL PAIN: 0
CONFUSION: 0
SHORTNESS OF BREATH: 0
COLOR CHANGE: 0
ARTHRALGIAS: 0
DIFFICULTY URINATING: 0
EYE REDNESS: 0
FEVER: 0
HEADACHES: 0

## 2022-06-03 NOTE — TELEPHONE ENCOUNTER
S ER provider called to give clinical on 46/M in [facility]    B Alcohol detox. Pt reports drinking 1/3 whiskey and 4-5 ciders a day for years. Last drink 4:30am, breathalyzer this afternoon of .195. Pt denies HX of withdrawal seizures or DT's. Pt reports withdrawal symptoms of shakiness, nausea, vomiting when trying to stop drinking on own. On MSSA protocol with score of 7.Pt denies SI, HI or other mental health concerns. Pt denies substance use. No medical concerns. Ambulatory, eating, drinking.     A Vol     R In [facility] [place] [facility] awaiting detox placement  Patient cleared and ready for behavioral bed placement: Yes

## 2022-06-03 NOTE — ED TRIAGE NOTES
Looking for detox off of alcohol. Drinks 4-5 ciders and a 3rd of whiskey daily. Pt denies any withdrawal seizures. Pt denies any other substance use.

## 2022-06-03 NOTE — ED PROVIDER NOTES
[facility] EMERGENCY DEPARTMENT ([facility])       6/03/22  History     Chief Complaint   Patient presents with     Drug / Alcohol Assessment     Looking for detox off of alcohol, ciders and whiskey -3/4 ciders and 3rd of a bottle of whiskey daily. Last drink around 0430. Denies any withdrawal seizures     The history is provided by the patient, medical records and the spouse.     Florian Rodriguez is a 46 year old male with a past medical history significant for alcohol abuse who presents to the Emergency Department for evaluation of alcohol intoxication.     Patient states that he has been drinking about 3-4 ciders and 1/3 of a bottle whiskey a day. He says that he has been drinking this amount for years. He adds that his drink was this morning at 0430. He says that when he is not drinking he feels fever, chills, and vomiting. Patient reports that he is here in the ED today for medical advice for how to withdrawal at home. Patient endorses that he does not want to go upstairs because he is uncomfortable with and rather be at home for detox. Patient denies history of alcohol withdrawal seizure and withdrawal DTs. Patient denies history of ICU admission for alcohol. Patient denies illicit drug use. Patient reports that he does use tobacco.     Social history: patient is here in the ED with his wife.    History reviewed. No pertinent past medical history.    History reviewed. No pertinent surgical history.    History reviewed. No pertinent family history.    Social History     Tobacco Use     Smoking status: Current Every Day Smoker     Types: Cigarettes     Start date: 1/1/1995     Smokeless tobacco: Never Used   Substance Use Topics     Alcohol use: Yes     Comment: daily, 4-5 ciders, 3rd of whiskey daily       Current Facility-Administered Medications   Medication     diazepam (VALIUM) tablet 5-20 mg     Current Outpatient Medications   Medication     varenicline (CHANTIX KIA) 0.5 MG X 11 & 1 MG X 42  tablet      No Known Allergies     I have reviewed the Medications, Allergies, Past Medical and Surgical History, and Social History in the Epic system.    Review of Systems   Constitutional: Negative for fever.   HENT: Negative for congestion.    Eyes: Negative for redness.   Respiratory: Negative for shortness of breath.    Cardiovascular: Negative for chest pain.   Gastrointestinal: Negative for abdominal pain.   Genitourinary: Negative for difficulty urinating.   Musculoskeletal: Negative for arthralgias and neck stiffness.   Skin: Negative for color change.   Neurological: Negative for headaches.   Psychiatric/Behavioral: Negative for confusion.     A complete review of systems was performed with pertinent positives and negatives noted in the HPI, and all other systems negative.    Physical Exam   BP: 127/86  Pulse: 107  Temp: 98.4  F (36.9  C)  Resp: 16  Height: 182.9 cm (6')  Weight: 73 kg (161 lb)  SpO2: 97 %      Physical Exam  Vitals and nursing note reviewed.   Constitutional:       General: He is not in acute distress.     Appearance: He is well-developed. He is not ill-appearing or toxic-appearing.   HENT:      Head: Normocephalic and atraumatic.   Eyes:      General: No scleral icterus.     Pupils: Pupils are equal, round, and reactive to light.   Cardiovascular:      Rate and Rhythm: Normal rate and regular rhythm.   Pulmonary:      Effort: Pulmonary effort is normal. No respiratory distress.   Musculoskeletal:      Cervical back: Normal range of motion and neck supple.   Skin:     General: Skin is warm and dry.      Coloration: Skin is not pale.      Findings: No rash.   Neurological:      Mental Status: He is alert and oriented to person, place, and time.      Sensory: No sensory deficit.   Psychiatric:         Attention and Perception: Attention and perception normal.         Speech: Speech normal.         Behavior: Behavior normal.         Thought Content: Thought content does not include  homicidal or suicidal ideation.         ED Course     At 12:59 PM the patient was seen and examined by Nathaniel Thurston MD in Room EDHW06.        Procedures                Results for orders placed or performed during the hospital encounter of 06/03/22 (from the past 24 hour(s))   Alcohol breath test POCT   Result Value Ref Range    Alcohol Breath Test 0.195 (A) 0.00 - 0.01   Urine Drugs of Abuse Screen    Narrative    The following orders were created for panel order Urine Drugs of Abuse Screen.  Procedure                               Abnormality         Status                     ---------                               -----------         ------                     Drug abuse screen 1 urin...[[id number]]  Normal              Final result                 Please view results for these tests on the individual orders.   Drug abuse screen 1 urine (ED)   Result Value Ref Range    Amphetamines Urine Screen Negative Screen Negative    Barbiturates Urine Screen Negative Screen Negative    Benzodiazepines Urine Screen Negative Screen Negative    Cannabinoids Urine Screen Negative Screen Negative    Cocaine Urine Screen Negative Screen Negative    Opiates Urine Screen Negative Screen Negative   CBC with platelets differential    Narrative    The following orders were created for panel order CBC with platelets differential.  Procedure                               Abnormality         Status                     ---------                               -----------         ------                     CBC with platelets and d...[[id number]]  Abnormal            Final result                 Please view results for these tests on the individual orders.   Comprehensive metabolic panel   Result Value Ref Range    Sodium 139 133 - 144 mmol/L    Potassium 3.9 3.4 - 5.3 mmol/L    Chloride 103 94 - 109 mmol/L    Carbon Dioxide (CO2) 28 20 - 32 mmol/L    Anion Gap 8 3 - 14 mmol/L    Urea Nitrogen 4 (L) 7 - 30 mg/dL    Creatinine 0.51  (L) 0.66 - 1.25 mg/dL    Calcium 9.4 8.5 - 10.1 mg/dL    Glucose 105 (H) 70 - 99 mg/dL    Alkaline Phosphatase 97 40 - 150 U/L    AST 90 (H) 0 - 45 U/L    ALT 67 0 - 70 U/L    Protein Total 8.0 6.8 - 8.8 g/dL    Albumin 4.3 3.4 - 5.0 g/dL    Bilirubin Total 0.5 0.2 - 1.3 mg/dL    GFR Estimate >90 >60 mL/min/1.73m2   Magnesium   Result Value Ref Range    Magnesium 2.5 (H) 1.6 - 2.3 mg/dL   Ethyl Alcohol Level   Result Value Ref Range    Alcohol ethyl 0.18 (H) <=0.01 g/dL   Asymptomatic COVID-19 Virus (Coronavirus) by PCR Nose    Specimen: Nose; Swab   Result Value Ref Range    SARS CoV2 PCR Negative Negative    Narrative    Testing was performed using the carey  SARS-CoV-2 & Influenza A/B Assay on the carey  Maribel  System.  This test should be ordered for the detection of SARS-COV-2 in individuals who meet SARS-CoV-2 clinical and/or epidemiological criteria. Test performance is unknown in asymptomatic patients.  This test is for in vitro diagnostic use under the FDA EUA for laboratories certified under CLIA to perform moderate and/or high complexity testing. This test has not been FDA cleared or approved.  A negative test does not rule out the presence of PCR inhibitors in the specimen or target RNA in concentration below the limit of detection for the assay. The possibility of a false negative should be considered if the patient's recent exposure or clinical presentation suggests COVID-19.  [facility] are certified under the Clinical Laboratory Improvement Amendments of 1988 (CLIA-88) as qualified to perform moderate and/or high complexity laboratory testing.   CBC with platelets and differential   Result Value Ref Range    WBC Count 4.4 4.0 - 11.0 10e3/uL    RBC Count 4.22 (L) 4.40 - 5.90 10e6/uL    Hemoglobin 14.8 13.3 - 17.7 g/dL    Hematocrit 41.8 40.0 - 53.0 %    MCV 99 78 - 100 fL    MCH 35.1 (H) 26.5 - 33.0 pg    MCHC 35.4 31.5 - 36.5 g/dL    RDW 15.2 (H) 10.0 - 15.0 %    Platelet Count 148  (L) 150 - 450 10e3/uL    % Neutrophils 59 %    % Lymphocytes 25 %    % Monocytes 10 %    % Eosinophils 3 %    % Basophils 2 %    % Immature Granulocytes 1 %    NRBCs per 100 WBC 0 <1 /100    Absolute Neutrophils 2.7 1.6 - 8.3 10e3/uL    Absolute Lymphocytes 1.1 0.8 - 5.3 10e3/uL    Absolute Monocytes 0.4 0.0 - 1.3 10e3/uL    Absolute Eosinophils 0.1 0.0 - 0.7 10e3/uL    Absolute Basophils 0.1 0.0 - 0.2 10e3/uL    Absolute Immature Granulocytes 0.0 <=0.4 10e3/uL    Absolute NRBCs 0.0 10e3/uL     Medications   diazepam (VALIUM) tablet 5-20 mg (5 mg Oral Given 6/3/22 1434)   ondansetron (ZOFRAN) injection 4 mg (4 mg Intravenous Given 6/3/22 1652)             Assessments & Plan (with Medical Decision Making)     This patient presented to the emergency department seeking detox for alcohol abuse.  I can find no acute medical or psychiatric issue that would preclude an admission to the detox unit.  Patient was placed on the MSSA protocol and blood work demonstrated no evidence of significant electrolyte abnormality.  There is some mild elevation of AST consistent with his pattern of alcohol abuse.  COVID test is negative.  I spoke with chemical dependency intake and they are arranging bed for the patient.    I have reviewed the nursing notes.    I have reviewed the findings, diagnosis, plan and need for follow up with the patient.    New Prescriptions    No medications on file       Final diagnoses:   Alcohol dependence with uncomplicated withdrawal (H)       IJody am serving as a trained medical scribe to document services personally performed by Nathaniel Thurston MD, based on the provider's statements to me.      INathaniel MD, was physically present and have reviewed and verified the accuracy of this note documented by Jody Townsend.     Nathaniel Thurston MD  6/3/2022   [facility] EMERGENCY DEPARTMENT     Nathaniel Thurston MD  06/11/22 [id number]

## 2022-06-04 ENCOUNTER — TELEPHONE (OUTPATIENT)
Dept: BEHAVIORAL HEALTH | Facility: CLINIC | Age: 47
End: 2022-06-04

## 2022-06-04 VITALS
HEART RATE: 76 BPM | BODY MASS INDEX: 21.81 KG/M2 | HEIGHT: 72 IN | RESPIRATION RATE: 16 BRPM | DIASTOLIC BLOOD PRESSURE: 78 MMHG | TEMPERATURE: 98.2 F | OXYGEN SATURATION: 98 % | WEIGHT: 161 LBS | SYSTOLIC BLOOD PRESSURE: 158 MMHG

## 2022-06-04 PROCEDURE — 250N000013 HC RX MED GY IP 250 OP 250 PS 637: Performed by: EMERGENCY MEDICINE

## 2022-06-04 RX ORDER — GABAPENTIN 400 MG/1
400 CAPSULE ORAL 3 TIMES DAILY
Qty: 12 CAPSULE | Refills: 0 | Status: SHIPPED | OUTPATIENT
Start: 2022-06-04

## 2022-06-04 RX ADMIN — DIAZEPAM 10 MG: 5 TABLET ORAL at 01:03

## 2022-06-04 NOTE — CONSULTS
Name:  Florian Rodriguez  : 1975  Date:   2022  Location of patient: [facility]   Location of doctor: Office [facility]  Length of consult:  10 minutes       Phone Consult:  46 year old male with history of alcohol use disorder severe who is voluntary for detox admission.  Withdrawal symptoms.    Medically cleared by ED.  Breathalyzer 195  Voluntary for admission to detox unit      Discussed with staff on site:  yes Cassidy Guillen M.D.  Psychiatry

## 2022-06-04 NOTE — ED PROVIDER NOTES
Emergency Department Patient Sign-out       Brief HPI:  This is a 46 year old male signed out to me by Dr. Guerrero .  See initial ED Provider note for details of the presentation.            Significant Events prior to my assuming care: Patient seen yesterday on the evening shift due to alcohol intoxication with history of alcohol dependence.  Was cleared medically and placed in the queue for voluntary detox admission.  Has been monitored and treated in the ED while awaiting bed placement.      Exam:   Patient Vitals for the past 24 hrs:   BP Temp Temp src Pulse Resp SpO2 Height Weight   06/04/22 0059 (!) 160/96 98.2  F (36.8  C) Oral 82 15 99 % -- --   06/03/22 1742 135/76 98.2  F (36.8  C) -- 109 16 99 % -- --   06/03/22 1626 (!) 141/83 98.8  F (37.1  C) Oral 95 16 99 % -- --   06/03/22 1232 127/86 98.4  F (36.9  C) Oral 107 16 97 % 1.829 m (6') 73 kg (161 lb)       EXAM:  HEENT: Normal.  Oropharynx clear and moist.  Neck: Supple, trachea midline, normal voice  Chest:  No respiratory distress, speaks in complete sentences, chest wall nontender, lungs clear in all fields  CV: Regular rate and rhythm, no murmur, normal pulse, no jugular venous distention  Abdomen: Nondistended, soft nontender.  No hepatosplenomegaly.  Extremities: No edema or tenderness  Mental Status Exam:    Appearance: Appropriately groomed  Attitude: Cooperative  Eye contact: Good  Mood: Normal  Affect: Normal  Speech: Fluent  Psychomotor behavior: No tardive dyskinesia, no dystonia, no tics  Thought Process:  logical, lnear  Associations: No loose associations  Thought content: Denies suicidal ideation, denies homicidal ideation,   no symptoms psychosis  Insight: Good  Judgement: Normal  Orientation: Fully oriented x3  Attention span: Normal  Recent/ remote memory:  intact  Language: English  Fund of knowledge:  appropriate for age  Gait and station:  normal        ED RESULTS:   Results for orders placed or performed during the hospital  encounter of 06/03/22 (from the past 24 hour(s))   Alcohol breath test POCT     Status: Abnormal    Collection Time: 06/03/22 12:35 PM   Result Value Ref Range    Alcohol Breath Test 0.195 (A) 0.00 - 0.01   Urine Drugs of Abuse Screen     Status: Normal    Collection Time: 06/03/22  1:11 PM    Narrative    The following orders were created for panel order Urine Drugs of Abuse Screen.  Procedure                               Abnormality         Status                     ---------                               -----------         ------                     Drug abuse screen 1 urin...[[id number]]  Normal              Final result                 Please view results for these tests on the individual orders.   Drug abuse screen 1 urine (ED)     Status: Normal    Collection Time: 06/03/22  1:11 PM   Result Value Ref Range    Amphetamines Urine Screen Negative Screen Negative    Barbiturates Urine Screen Negative Screen Negative    Benzodiazepines Urine Screen Negative Screen Negative    Cannabinoids Urine Screen Negative Screen Negative    Cocaine Urine Screen Negative Screen Negative    Opiates Urine Screen Negative Screen Negative   CBC with platelets differential     Status: Abnormal    Collection Time: 06/03/22  2:27 PM    Narrative    The following orders were created for panel order CBC with platelets differential.  Procedure                               Abnormality         Status                     ---------                               -----------         ------                     CBC with platelets and d...[[id number]]  Abnormal            Final result                 Please view results for these tests on the individual orders.   Comprehensive metabolic panel     Status: Abnormal    Collection Time: 06/03/22  2:27 PM   Result Value Ref Range    Sodium 139 133 - 144 mmol/L    Potassium 3.9 3.4 - 5.3 mmol/L    Chloride 103 94 - 109 mmol/L    Carbon Dioxide (CO2) 28 20 - 32 mmol/L    Anion Gap 8 3 - 14 mmol/L     Urea Nitrogen 4 (L) 7 - 30 mg/dL    Creatinine 0.51 (L) 0.66 - 1.25 mg/dL    Calcium 9.4 8.5 - 10.1 mg/dL    Glucose 105 (H) 70 - 99 mg/dL    Alkaline Phosphatase 97 40 - 150 U/L    AST 90 (H) 0 - 45 U/L    ALT 67 0 - 70 U/L    Protein Total 8.0 6.8 - 8.8 g/dL    Albumin 4.3 3.4 - 5.0 g/dL    Bilirubin Total 0.5 0.2 - 1.3 mg/dL    GFR Estimate >90 >60 mL/min/1.73m2   Magnesium     Status: Abnormal    Collection Time: 06/03/22  2:27 PM   Result Value Ref Range    Magnesium 2.5 (H) 1.6 - 2.3 mg/dL   Ethyl Alcohol Level     Status: Abnormal    Collection Time: 06/03/22  2:27 PM   Result Value Ref Range    Alcohol ethyl 0.18 (H) <=0.01 g/dL   Asymptomatic COVID-19 Virus (Coronavirus) by PCR Nose     Status: Normal    Collection Time: 06/03/22  2:27 PM    Specimen: Nose; Swab   Result Value Ref Range    SARS CoV2 PCR Negative Negative    Narrative    Testing was performed using the [employer]y  SARS-CoV-2 & Influenza A/B Assay on the care[employer]  [employer]  System.  This test should be ordered for the detection of SARS-COV-2 in individuals who meet SARS-CoV-2 clinical and/or epidemiological criteria. Test performance is unknown in asymptomatic patients.  This test is for in vitro diagnostic use under the FDA EUA for laboratories certified under CLIA to perform moderate and/or high complexity testing. This test has not been FDA cleared or approved.  A negative test does not rule out the presence of PCR inhibitors in the specimen or target RNA in concentration below the limit of detection for the assay. The possibility of a false negative should be considered if the patient's recent exposure or clinical presentation suggests COVID-19.  [facility] are certified under the Clinical Laboratory Improvement Amendments of 1988 (CLIA-88) as qualified to perform moderate and/or high complexity laboratory testing.   CBC with platelets and differential     Status: Abnormal    Collection Time: 06/03/22  2:27 PM   Result Value Ref  "Range    WBC Count 4.4 4.0 - 11.0 10e3/uL    RBC Count 4.22 (L) 4.40 - 5.90 10e6/uL    Hemoglobin 14.8 13.3 - 17.7 g/dL    Hematocrit 41.8 40.0 - 53.0 %    MCV 99 78 - 100 fL    MCH 35.1 (H) 26.5 - 33.0 pg    MCHC 35.4 31.5 - 36.5 g/dL    RDW 15.2 (H) 10.0 - 15.0 %    Platelet Count 148 (L) 150 - 450 10e3/uL    % Neutrophils 59 %    % Lymphocytes 25 %    % Monocytes 10 %    % Eosinophils 3 %    % Basophils 2 %    % Immature Granulocytes 1 %    NRBCs per 100 WBC 0 <1 /100    Absolute Neutrophils 2.7 1.6 - 8.3 10e3/uL    Absolute Lymphocytes 1.1 0.8 - 5.3 10e3/uL    Absolute Monocytes 0.4 0.0 - 1.3 10e3/uL    Absolute Eosinophils 0.1 0.0 - 0.7 10e3/uL    Absolute Basophils 0.1 0.0 - 0.2 10e3/uL    Absolute Immature Granulocytes 0.0 <=0.4 10e3/uL    Absolute NRBCs 0.0 10e3/uL       ED MEDICATIONS:   Medications   diazepam (VALIUM) tablet 5-20 mg (10 mg Oral Given 6/4/22 0103)   ondansetron (ZOFRAN) injection 4 mg (4 mg Intravenous Given 6/3/22 1652)   nicotine polacrilex (NICORETTE) gum 4 mg (4 mg Buccal Given 6/3/22 2000)         Impression:    ICD-10-CM    1. Alcohol dependence with uncomplicated withdrawal (H)  F10.230        Plan:    Pending studies include none.  This morning the patient informed the nurse that he no longer was willing to be admitted to the detox unit.  I went and spoke with him, he has a mild tremor and his blood pressure is slightly elevated.  I advised him that I feel he is still experiencing potentially medically serious alcohol withdrawal.  Patient states he is still unwilling to stay, has a plan to enter a program which is \"more holistic\" than the program here and states he has several friends will be supporting him as he completes his period of detox and then enters the treatment phase.  He is quite clear that he will no longer consent to be admitted here, is now clinically sober, and does not meet grounds for involuntary hold.  We will allow him to be discharged as per his wishes, I did " prescribe gabapentin to assist with any ongoing withdrawal symptoms.  He does deny to me that there is any history of DTs or seizures in the past.        MD Delicia Colorado David, MD  06/04/22 1402

## 2022-06-04 NOTE — DISCHARGE INSTRUCTIONS
Thank you for choosing [facility].     Please closely monitor for further symptoms. Return to the Emergency Department if you develop any new or worsening signs or symptoms.    If you received any opiate pain medications or sedatives during your visit, please do not drive for at least 8 hours.     Labs, cultures or final xray interpretations may still need to be reviewed.  We will call you if your plan of care needs to be changed.    Please follow up with your planned outpatient resources to assist you with completing your detox.,  And to help maintain sobriety.  Please also follow-up with your primary care physician or clinic.  May use gabapentin as prescribed to assist with withdrawal symptoms.  If you feel you are worsening or your symptoms are not adequately controlled please return to ED.

## 2022-06-04 NOTE — ED NOTES
Patient no longer wanting to go to detox.   Dr. Guerrero updated. Plan to discharge patient this morning.  Handoff report given to MICHELLE Gonzalez.  Informed of course of ED stay and plan of care.  Carlos verbalized understanding.

## 2022-06-04 NOTE — TELEPHONE ENCOUNTER
R: DUANE / Rose / NIKKO  05:24 - Dr. Guillen accepts for detox. Placed pt in queue. 05:27 - Notified unit RN. Unit short staffed until day shift. 05:28 - Notified ED

## 2022-06-06 ENCOUNTER — TELEPHONE (OUTPATIENT)
Dept: BEHAVIORAL HEALTH | Facility: CLINIC | Age: 47
End: 2022-06-06
Payer: COMMERCIAL

## 2022-06-07 ENCOUNTER — HOSPITAL ENCOUNTER (OUTPATIENT)
Dept: BEHAVIORAL HEALTH | Facility: CLINIC | Age: 47
Discharge: HOME | End: 2022-06-07
Attending: FAMILY MEDICINE | Admitting: FAMILY MEDICINE
Payer: COMMERCIAL

## 2022-06-07 VITALS — HEIGHT: 72 IN | WEIGHT: 165 LBS | BODY MASS INDEX: 22.35 KG/M2

## 2022-06-07 PROCEDURE — H0001 ALCOHOL AND/OR DRUG ASSESS: HCPCS | Mod: 95

## 2022-06-07 ASSESSMENT — COLUMBIA-SUICIDE SEVERITY RATING SCALE - C-SSRS
1. HAVE YOU WISHED YOU WERE DEAD OR WISHED YOU COULD GO TO SLEEP AND NOT WAKE UP?: NO
6. HAVE YOU EVER DONE ANYTHING, STARTED TO DO ANYTHING, OR PREPARED TO DO ANYTHING TO END YOUR LIFE?: NO
2. HAVE YOU ACTUALLY HAD ANY THOUGHTS OF KILLING YOURSELF?: NO
ATTEMPT LIFETIME: NO
TOTAL  NUMBER OF INTERRUPTED ATTEMPTS LIFETIME: NO
TOTAL  NUMBER OF ABORTED OR SELF INTERRUPTED ATTEMPTS LIFETIME: NO

## 2022-06-07 ASSESSMENT — PAIN SCALES - GENERAL: PAINLEVEL: NO PAIN (0)

## 2022-06-07 NOTE — TELEPHONE ENCOUNTER
Florian Rodriguez Release of Information requests were e-mailed to the [place] [id number] OB's at [email or web address] on 6/7/2022 with requests for the following releases:    Patient's e-mail address: [email or web address]    1.) [employer] - [id number] - Collateral Contact & Emergency Contact   Jimy Fritz (friend/roommate)  Tel #: [phone]

## 2022-06-07 NOTE — PROGRESS NOTES
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 the substance is continued despite knowledge of having a persistent or recurrent physical or psychological problem that is likely to have been cause or exacerbated by the substance.  Met for:  alcohol.  10.)  Tolerance:  either a need for markedly increased amounts of the substance to achieve the desired effect or a markedly diminished effect with continued use of the dame amount of the substance.  Met for:  alcohol.  11.)  Withdrawal:  either patient endorses characteristic withdrawal syndrome for the substance or the substance (or closely related substance) is taken to relieve or avoid withdrawal symptoms.  Met for:  alcohol.    As evidenced by self report and criteria, the patient meets the following DSM-5 Diagnoses: (Sustained by DSM-5 Criteria Listed Above)      1.)  Alcohol Use Disorder Severe - 303.90 (F10.20)  2.)  Rule out Anxiety disorder NOS, per patient self-report    Specify if: In early remission:  After full criteria for alcohol/drug use disorder were previously met, none of the criteria for alcohol/drug use disorder have been met for at least 3 months but for less than 12 months (with the  exception that Criterion A4,  Craving or a strong desire or urge to use alcohol/drug  may be met).     In sustained remission:   After full criteria for alcohol use disorder were previously met, none of the criteria for alcohol/drug use disorder have been met at any time during a period of 12 months or longer (with the exception that Criterion A4,  Craving or strong desire or urge to use alcohol/drug  may be met).     Specify if:   This additional specifier is used if the individual is in an environment where access to alcohol is restricted.    Mild: Presence of 2-3 symptoms  Moderate: Presence of 4-5 symptoms  Severe: Presence of 6 or more symptoms    Recommendations:     1. Plan for Safety and Risk Management:     It was recommended the patient call 911 or go to the local Emergency Department should there be any significant change in the above risk factors.            Report to child / adult protection services was NA.    2. ENIO Referrals:      Recommendations:      1.)  It was recommended the patient abstain from alcohol and from all other non-prescribed mood altering chemicals.  2.)  Have an outpatient mental health assessment to rule out having a current clinical mental health disorder.  3.)  Follow all of the recommendations of his medical and mental health providers.  4.)  Enter a virtual or hybrid Intensive Outpatient program at one of the [facility] locations for substance use disorder treatment or for co-occurring mental health and substance use disorder treatment.  5.)  Follow all of the recommendations of his substance use disorder treatment providers.  6.)  Attend 12-step or other support group meetings on a weekly basis.     Rational for recommended level of care: The patient lacks long-term sober maintenance skills, lacks sober coping skills, lacks awareness regarding the disease model of addiction, lacks a sober peer support network, has dual issues of mental health and substance abuse, has  medical issues which are exacerbated by substance abuse and has mental health issues which are exacerbated by substance abuse.    The patient reported he is willing to follow the above recommendations.    The patient would like the following family or other support people involved in their treatment:  None at this time.    The patient does not have a history of opiate use.    3.  Mental Health Referrals:     It was recommended the patient have an outpatient mental health assessment to rule out having a current clinical mental health disorder.    4. Cultural Concerns:    The patient did not identify having any cultural concerns regarding mental health, physical health, or substance use issues.     5. Recommendations for treatment focus:      Alcohol / Substance Use - See #2. ENIO Referrals above for details on recommendations.  Anxiety - See #3. Mental Health Referrals above for details on recommendations.  Grief / Loss - See #3. Mental Health Referrals above for details.    Provider Name/ Credentials:  ABE Hensley  June 7, 2022

## 2022-06-08 NOTE — TELEPHONE ENCOUNTER
The below telephone note was routed to the [facility] UR department at: [facility] [[id number]] and to the [facility] IOP Admissions Department at: [facility]ULT IOP INTAKE POOL [[id number]] on 6/8/2022 as a referral for IOP treatment at [facility].    A.)  Name: Florian Rodriguez Tel #: [phone]  B.)  MRN: [id number]  C.)  Referral is for: a virtual or hybrid Intensive Outpatient program at one of the [facility] for substance use disorder treatment or for co-occurring mental health and substance use disorder treatment.  D.)  Notes: The patient is flexible on being able to participate in day time or evening intensive outpatient substance use disorder treatment program.  If the current wait list is to long for the patient to enter a virtual or hybrid Intensive Outpatient program at one of the [facility], please direct the patient to contact this counselor at [phone] for referrals outside of the [facility] system.     Thanks,    ABE Hensley

## 2022-06-08 NOTE — TELEPHONE ENCOUNTER
Spoke to client regarding IOP, he would like time to think it over and he will follow up if he wants to be scheduled. Client was also provided assessors information to follow up if he needs a referral to a different program outside of [place], the frequency and duration of our IOP is something the client was needing to think about.

## 2022-06-08 NOTE — TELEPHONE ENCOUNTER
This patient's ENIO Assessment DAANES information was entered directly into the [place] [facility] DAANES website on 6/8/2022.  Assessment ID: [id number]

## 2022-10-02 ENCOUNTER — HEALTH MAINTENANCE LETTER (OUTPATIENT)
Age: 47
End: 2022-10-02

## 2023-02-11 ENCOUNTER — HEALTH MAINTENANCE LETTER (OUTPATIENT)
Age: 48
End: 2023-02-11

## 2023-03-06 ENCOUNTER — HOSPITAL ENCOUNTER (OUTPATIENT)
Dept: BEHAVIORAL HEALTH | Facility: CLINIC | Age: 48
Discharge: HOME | End: 2023-03-06
Attending: FAMILY MEDICINE | Admitting: FAMILY MEDICINE
Payer: MEDICAID

## 2023-03-06 PROCEDURE — 90791 PSYCH DIAGNOSTIC EVALUATION: CPT | Mod: GT | Performed by: COUNSELOR

## 2023-03-06 RX ORDER — PRAZOSIN HYDROCHLORIDE 1 MG/1
CAPSULE ORAL
COMMUNITY
Start: 2023-01-31

## 2023-03-06 RX ORDER — ESCITALOPRAM OXALATE 5 MG/1
TABLET ORAL
COMMUNITY
Start: 2023-01-22

## 2023-03-06 ASSESSMENT — ANXIETY QUESTIONNAIRES
7. FEELING AFRAID AS IF SOMETHING AWFUL MIGHT HAPPEN: SEVERAL DAYS
5. BEING SO RESTLESS THAT IT IS HARD TO SIT STILL: SEVERAL DAYS
6. BECOMING EASILY ANNOYED OR IRRITABLE: SEVERAL DAYS
8. IF YOU CHECKED OFF ANY PROBLEMS, HOW DIFFICULT HAVE THESE MADE IT FOR YOU TO DO YOUR WORK, TAKE CARE OF THINGS AT HOME, OR GET ALONG WITH OTHER PEOPLE?: SOMEWHAT DIFFICULT
4. TROUBLE RELAXING: SEVERAL DAYS
GAD7 TOTAL SCORE: 7
7. FEELING AFRAID AS IF SOMETHING AWFUL MIGHT HAPPEN: SEVERAL DAYS
3. WORRYING TOO MUCH ABOUT DIFFERENT THINGS: SEVERAL DAYS
GAD7 TOTAL SCORE: 7
1. FEELING NERVOUS, ANXIOUS, OR ON EDGE: SEVERAL DAYS
IF YOU CHECKED OFF ANY PROBLEMS ON THIS QUESTIONNAIRE, HOW DIFFICULT HAVE THESE PROBLEMS MADE IT FOR YOU TO DO YOUR WORK, TAKE CARE OF THINGS AT HOME, OR GET ALONG WITH OTHER PEOPLE: SOMEWHAT DIFFICULT
2. NOT BEING ABLE TO STOP OR CONTROL WORRYING: SEVERAL DAYS
GAD7 TOTAL SCORE: 7

## 2023-03-06 ASSESSMENT — PAIN SCALES - GENERAL: PAINLEVEL: NO PAIN (0)

## 2023-03-06 ASSESSMENT — COLUMBIA-SUICIDE SEVERITY RATING SCALE - C-SSRS
4. HAVE YOU HAD THESE THOUGHTS AND HAD SOME INTENTION OF ACTING ON THEM?: NO
3. HAVE YOU BEEN THINKING ABOUT HOW YOU MIGHT KILL YOURSELF?: NO
1. IN THE PAST MONTH, HAVE YOU WISHED YOU WERE DEAD OR WISHED YOU COULD GO TO SLEEP AND NOT WAKE UP?: NO
5. HAVE YOU STARTED TO WORK OUT OR WORKED OUT THE DETAILS OF HOW TO KILL YOURSELF? DO YOU INTEND TO CARRY OUT THIS PLAN?: NO
2. HAVE YOU ACTUALLY HAD ANY THOUGHTS OF KILLING YOURSELF IN THE PAST MONTH?: NO
6. HAVE YOU EVER DONE ANYTHING, STARTED TO DO ANYTHING, OR PREPARED TO DO ANYTHING TO END YOUR LIFE?: NO

## 2023-03-06 ASSESSMENT — PATIENT HEALTH QUESTIONNAIRE - PHQ9: SUM OF ALL RESPONSES TO PHQ QUESTIONS 1-9: 8

## 2023-03-06 NOTE — PROGRESS NOTES
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 Trauma- and Stressor-Related Disorder, Symptoms characteristic of a trauma and stressor related disorder that cause clinically significant distress or impairment in social, occupational, or other important areas of functioning predominate but do not meet the full criteria for any of the disorders in the trauma and stressor related disorders diagnostic class.       Functional Status:  Patient reports the following functional impairments:  health maintenance; money management; work or vocational responsibilities.     Nonprogrammatic care:  Patient is requesting basic services to address current mental health concerns.    Clinical Summary:  1. Reason for assessment: Due to substance use disorder.  2. Psychosocial, Cultural and Contextual Factors: unemployed.  3. Principal DSM5 Diagnoses  (Sustained by DSM5 Criteria Listed Above):   Substance-Related & Addictive Disorders Alcohol Use Disorder   303.90 (F10.20) Severe Sustained.  4. Other Diagnoses that is relevant to services:   296.21 (F32.0) Major Depressive Disorder, Single Episode, Mild _ and With melancholic features  300.00 (F41.9) Unspecified Anxiety Disorder  309.9 (F43.9) Unspecified Trauma and Stressor Related Disorder.  5. Provisional  Diagnosis:  Ruled out schizophrenia.  6. Prognosis: Expect Improvement and Relieve Acute Symptoms.  7. Likely consequences of symptoms if not treated: patient s ongoing symptoms are more than likely to get worse and also experience a decreased daily in functioning and may require a higher level of care.  8. Client strengths include:  caring, educated, has a previous history of therapy, insightful, intelligent, motivated, open to learning, open to suggestions / feedback, support of family, friends and providers, wants to learn, willing to ask questions and willing to relate to others .     Recommendations:     1. Plan for Safety and Risk Management:   Safety and Risk: Recommended that patient call 911 or go to the local ED should there be a change in any of these risk factors..          Report to child / adult protection services was NA.     2. Patient's identified no yarelis / Druze / spiritual influences relevant to [facility] programming at this time.    3. Initial Treatment will focus on:    Depressed Mood -    Anxiety -    Functional Impairment at: work   Risk Management / Safety Concerns related to: none   Alcohol / Substance Use -      4. Resources/Service Plan:    services are not indicated.   Modifications to assist communication are not indicated.   Additional disability accommodations are not indicated.      5. Collaboration:   Collaboration / coordination of treatment will be initiated with the following support professionals: Targeted Case Management (TCM) and Darshana Gibbons (soy) [phone].      6.  Referrals:   The following referral(s) will be initiated: [facility]. Next Scheduled Appointment: [facility].      A Release of Information has been obtained for the following: Targeted Case Management (TCM).     Emergency Contact SANDRA was obtained.      Clinical Substantiation/medical necessity for the above  recommendations:  Patient is a 47-year-old  heterosexual male who presents with a history of alcohol use disorder. Patient denies any history of psychiatric hospitalizations but has one IOP substance use treatment programming and outpatient mental health therapy in the past. Patient is now looking for support in managing his mental health symptoms and alcohol use problem. Patient is currently taking any medications for his mental health and denies any medical issues or complications resulting from drinking.      Patient endorses with Substance Use:  blackouts, passing out, vomiting, hangovers, daily use, substance related decrease in work performance, work absence due to substance use and cravings/urges to use. Change in sleep, Lack of interest, Excessive or inappropriate guilt, change in energy level, change in appetite, Feelings of hopelessness, Feelings of helplessness, Low self-worth, Ruminations, Irritability, feeling sad, down, or depressed and Withdrawn. Experienced traumatic event a series of road bumps in life. Pt reports history of trauma issues due to going through a bad break-up with an ex-girlfriend, having 3 close friends commit suicide over the past few years and having a few close friends pass away from illness over the past few years. Excessive worry, Nervousness, Fears/phobias that he is not going to be able to kick this habit, Sleep disturbance, Psychomotor agitation, Ruminations and Irritability. Patient reported the following problems as a result of his substance use: DUI, financial problems; occupational/vocational problems; relationship problems.  Patient is concerned about substance use    Patient denies any current legal and is currently unemployed. Patient is in agreement with referral to [facility] programming for his alcoholism. Referrals will be initiated to [facility], [facility] and [facility].     Patient's acute suicide risk was determined to be low due to  the following factors: Denial of suicidal thoughts, no history of suicide attempts. Patient is not currently under the influence of alcohol or illicit substances, denies experiencing command hallucinations, and has no immediate access to firearms. Patient's acute risk could be higher if noncompliant with treatment plan, medications, follow-up appointments or using illicit substances or alcohol. Protective factors include agreement to use safety plan. Patient denies any recent suicidal ideation, he denies suicide attempts, is not currently using illicit substances and so a safety plan was not developed. Patient instructed to present to his nearest emergency room if symptoms increase.    7. ENIO:    ENIO:  Discussed the general effects of drugs and alcohol on health and well-being and Attend a sober community support program including AA, SMART Recovery, Refuge Recovery, etc.). Provider gave patient printed information about the effects of chemical use on his health and well being.   Recommendations:   1)  Complete a residential based or similar treatment program, such as [facility], [facility] and [facility].  [facility] [address], [place] [address]   [phone]    [address] [address]   [phone]  2)  Abstain from all mood-altering chemicals unless prescribed by a licensed provider.   3)  Attend, at minimum, 2 weekly support group meetings, such as 12 step based (AA/NA), SMART Recovery, Health Realizations, and/or Refuge Recovery meetings.     4)  Actively work with a male mentor/sponsor on a weekly basis.   5)  Follow all the recommendations of your treatment/medical providers.  8. Records:   These were reviewed at time of assessment.   Information in this assessment was obtained from the medical record and provided by patient and friend who is a good historian. Patient will have open access  to his mental health medical record.    9.   Interactive Complexity: No    Daanes: Record has been saved! Assessment ID: [id number]    Provider Name/ Credentials:  Boubacar Camarena, [facility]  [facility]   Phone: [phone]  Fax: [phone]    March 6, 2023

## 2023-03-09 NOTE — ADDENDUM NOTE
Encounter addended by: Boubacar Camarena, [facility], [facility] on: 3/9/2023 9:14 AM   Actions taken: Clinical Note Signed

## 2023-08-12 ENCOUNTER — HEALTH MAINTENANCE LETTER (OUTPATIENT)
Age: 48
End: 2023-08-12

## 2024-10-05 ENCOUNTER — HEALTH MAINTENANCE LETTER (OUTPATIENT)
Age: 49
End: 2024-10-05